# Patient Record
Sex: FEMALE | Race: WHITE | NOT HISPANIC OR LATINO | URBAN - METROPOLITAN AREA
[De-identification: names, ages, dates, MRNs, and addresses within clinical notes are randomized per-mention and may not be internally consistent; named-entity substitution may affect disease eponyms.]

---

## 2023-07-16 ENCOUNTER — CLAIMS CREATED FROM THE CLAIM WINDOW (OUTPATIENT)
Dept: URBAN - METROPOLITAN AREA MEDICAL CENTER 21 | Facility: MEDICAL CENTER | Age: 88
End: 2023-07-16
Payer: MEDICARE

## 2023-07-16 DIAGNOSIS — D64.9 ANEMIA: ICD-10-CM

## 2023-07-16 DIAGNOSIS — R10.84 ACUTE GENERALIZED ABDOMINAL PAIN: ICD-10-CM

## 2023-07-16 DIAGNOSIS — R93.3 ABN FINDINGS-GI TRACT: ICD-10-CM

## 2023-07-16 DIAGNOSIS — K92.1 MELENA: ICD-10-CM

## 2023-07-16 PROCEDURE — 99223 1ST HOSP IP/OBS HIGH 75: CPT | Performed by: SPECIALIST

## 2023-07-17 ENCOUNTER — CLAIMS CREATED FROM THE CLAIM WINDOW (OUTPATIENT)
Dept: URBAN - METROPOLITAN AREA MEDICAL CENTER 21 | Facility: MEDICAL CENTER | Age: 88
End: 2023-07-17
Payer: MEDICARE

## 2023-07-17 DIAGNOSIS — D64.9 ANEMIA: ICD-10-CM

## 2023-07-17 DIAGNOSIS — R10.84 ACUTE GENERALIZED ABDOMINAL PAIN: ICD-10-CM

## 2023-07-17 DIAGNOSIS — E80.6 HYPERBILIRUBINEMIA: ICD-10-CM

## 2023-07-17 DIAGNOSIS — R93.3 ABN FINDINGS-GI TRACT: ICD-10-CM

## 2023-07-17 DIAGNOSIS — R10.9 ABDOMINAL PAIN: ICD-10-CM

## 2023-07-17 DIAGNOSIS — K92.1 MELENA: ICD-10-CM

## 2023-07-17 DIAGNOSIS — R94.5 ELEVATED LIVER FUNCTION TESTS: ICD-10-CM

## 2023-07-17 PROCEDURE — 99232 SBSQ HOSP IP/OBS MODERATE 35: CPT | Performed by: INTERNAL MEDICINE

## 2023-07-21 ENCOUNTER — CLAIMS CREATED FROM THE CLAIM WINDOW (OUTPATIENT)
Dept: URBAN - METROPOLITAN AREA MEDICAL CENTER 21 | Facility: MEDICAL CENTER | Age: 88
End: 2023-07-21
Payer: MEDICARE

## 2023-07-21 DIAGNOSIS — D50.9 ANEMIA, IRON DEFICIENCY: ICD-10-CM

## 2023-07-21 DIAGNOSIS — K64.0 GRADE I HEMORRHOIDS: ICD-10-CM

## 2023-07-21 DIAGNOSIS — K57.30 DIVERTICULAR DISEASE OF LARGE INTESTINE WITHOUT PERFORATION OR ABSCESS: ICD-10-CM

## 2023-07-21 DIAGNOSIS — R74.01 TRANSAMINITIS: ICD-10-CM

## 2023-07-21 DIAGNOSIS — E80.6 HYPERBILIRUBINEMIA: ICD-10-CM

## 2023-07-21 DIAGNOSIS — K63.5 COLON POLYPS: ICD-10-CM

## 2023-07-21 DIAGNOSIS — D64.9 ANEMIA: ICD-10-CM

## 2023-07-21 PROCEDURE — 99232 SBSQ HOSP IP/OBS MODERATE 35: CPT | Performed by: INTERNAL MEDICINE

## 2023-07-21 PROCEDURE — 45380 COLONOSCOPY AND BIOPSY: CPT | Performed by: INTERNAL MEDICINE

## 2023-07-21 PROCEDURE — 45385 COLONOSCOPY W/LESION REMOVAL: CPT | Performed by: INTERNAL MEDICINE

## 2023-08-07 PROBLEM — 711150003: Status: ACTIVE | Noted: 2023-08-07

## 2023-08-07 PROBLEM — 16227531000119109: Status: ACTIVE | Noted: 2023-08-07

## 2023-08-07 PROBLEM — 31258000: Status: ACTIVE | Noted: 2023-08-07

## 2023-08-08 ENCOUNTER — OFFICE VISIT (OUTPATIENT)
Dept: URBAN - METROPOLITAN AREA CLINIC 68 | Facility: CLINIC | Age: 88
End: 2023-08-08

## 2023-08-08 NOTE — HPI-TODAY'S VISIT:
Case of a 89-year-old follow-up after recent hospitalization.  Patient was initially seen in the inpatient setting due to a complaint of general weakness and malaise.  She also had melena and anemia.  For work-up she underwent CAT scan that revealed dilated intra hepatic biliary system.  She underwent MRCP that raise a concern for possible underlying malignancy in the left hepatic duct.  She underwent ERCP with biliary brushings.  ERCP brushings were negative for malignancy positive for cholecystitis.  CA 19-9 was elevated.  She underwent lap cholecystectomy.  For evaluation of her melena she also underwent colonoscopy.  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  Patient comes in today for follow-up.

## 2023-08-11 ENCOUNTER — OFFICE VISIT (OUTPATIENT)
Dept: URBAN - METROPOLITAN AREA CLINIC 68 | Facility: CLINIC | Age: 88
End: 2023-08-11
Payer: MEDICARE

## 2023-08-11 ENCOUNTER — WEB ENCOUNTER (OUTPATIENT)
Dept: URBAN - METROPOLITAN AREA CLINIC 68 | Facility: CLINIC | Age: 88
End: 2023-08-11

## 2023-08-11 ENCOUNTER — DASHBOARD ENCOUNTERS (OUTPATIENT)
Age: 88
End: 2023-08-11

## 2023-08-11 VITALS
SYSTOLIC BLOOD PRESSURE: 100 MMHG | OXYGEN SATURATION: 93 % | DIASTOLIC BLOOD PRESSURE: 60 MMHG | HEART RATE: 68 BPM | HEIGHT: 64 IN | BODY MASS INDEX: 21.51 KG/M2 | WEIGHT: 126 LBS

## 2023-08-11 DIAGNOSIS — R93.2 ABNORMAL FINDINGS ON IMAGING OF BILIARY TRACT: ICD-10-CM

## 2023-08-11 DIAGNOSIS — C18.9 COLON ADENOCARCINOMA: ICD-10-CM

## 2023-08-11 PROBLEM — 442717005: Status: ACTIVE | Noted: 2023-08-07

## 2023-08-11 PROBLEM — 408645001: Status: ACTIVE | Noted: 2023-08-07

## 2023-08-11 PROCEDURE — 99214 OFFICE O/P EST MOD 30 MIN: CPT | Performed by: INTERNAL MEDICINE

## 2023-08-11 RX ORDER — AMIODARONE HYDROCHLORIDE 200 MG/1
TABLET ORAL
Qty: 90 TABLET | Status: ACTIVE | COMMUNITY

## 2023-08-11 RX ORDER — LORAZEPAM 1 MG/1
TAKE 1 TABLET EVERY DAY BY ORAL ROUTE AS NEEDED TABLET ORAL
Qty: 30 EACH | Refills: 5 | Status: ACTIVE | COMMUNITY

## 2023-08-11 RX ORDER — SERTRALINE 50 MG/1
TABLET, FILM COATED ORAL
Qty: 135 TABLET | Status: ACTIVE | COMMUNITY

## 2023-08-11 RX ORDER — APIXABAN 5 MG/1
TAKE 1 TABLET BY MOUTH IN THE MORNING AND AT BEDTIME TABLET, FILM COATED ORAL
Qty: 60 EACH | Refills: 0 | Status: ACTIVE | COMMUNITY

## 2023-08-11 RX ORDER — POTASSIUM CHLORIDE 20 MEQ/1
TABLET, EXTENDED RELEASE ORAL
Qty: 30 TABLET | Status: ACTIVE | COMMUNITY

## 2023-08-11 RX ORDER — DILTIAZEM HYDROCHLORIDE 120 MG/1
CAPSULE, EXTENDED RELEASE ORAL
Qty: 30 EACH | Refills: 0 | Status: ACTIVE | COMMUNITY

## 2023-08-11 RX ORDER — PROPRANOLOL HYDROCHLORIDE 20 MG/1
TAKE 1 TABLET BY MOUTH EVERY DAY TABLET ORAL
Qty: 90 EACH | Refills: 0 | Status: ACTIVE | COMMUNITY

## 2023-08-11 RX ORDER — ROSUVASTATIN CALCIUM 10 MG/1
TABLET, FILM COATED ORAL
Qty: 90 TABLET | Status: ACTIVE | COMMUNITY

## 2023-08-11 RX ORDER — METOPROLOL 100 MG/1
TABLET ORAL
Qty: 60 TABLET | Status: ACTIVE | COMMUNITY

## 2023-08-11 NOTE — HPI-TODAY'S VISIT:
Case of a 89-year-old female patient that comes in today for follow-up.  Patient was initially for inpatient setting due to anemia melena abnormal imaging of the GI tract suggestive of a cholangiocarcinoma.  She underwent MRCP and ERCP with biliary brushings. Brushings were negative for malignancy.  Nevertheless CA 19-9 was elevated.  She underwent lap cholecystectomy during hospitalization due to ERCP findings of absence of gallbladder and cystic duct filling during balloon occlusion cholangiogram.  ERCP did not evidence etiology for her melena.  She underwent colonoscopy several colon polyps were removed.  Interestingly colon polyps were remarkable for colon cancer.  She comes in today for evaluation. She refers she is feeling well. She is feeling weak and tired.